# Patient Record
Sex: FEMALE | Race: WHITE | NOT HISPANIC OR LATINO | URBAN - METROPOLITAN AREA
[De-identification: names, ages, dates, MRNs, and addresses within clinical notes are randomized per-mention and may not be internally consistent; named-entity substitution may affect disease eponyms.]

---
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
## 2021-07-20 PROBLEM — Z00.00 ENCOUNTER FOR PREVENTIVE HEALTH EXAMINATION: Status: ACTIVE | Noted: 2021-07-20

## 2021-08-11 ENCOUNTER — APPOINTMENT (OUTPATIENT)
Dept: OBGYN | Facility: CLINIC | Age: 21
End: 2021-08-11

## 2021-11-19 ENCOUNTER — APPOINTMENT (OUTPATIENT)
Dept: OBGYN | Facility: CLINIC | Age: 21
End: 2021-11-19
Payer: MEDICAID

## 2021-11-19 ENCOUNTER — NON-APPOINTMENT (OUTPATIENT)
Age: 21
End: 2021-11-19

## 2021-11-19 VITALS
DIASTOLIC BLOOD PRESSURE: 72 MMHG | BODY MASS INDEX: 26.63 KG/M2 | WEIGHT: 156 LBS | SYSTOLIC BLOOD PRESSURE: 120 MMHG | HEIGHT: 64 IN

## 2021-11-19 DIAGNOSIS — Z32.02 ENCOUNTER FOR PREGNANCY TEST, RESULT NEGATIVE: ICD-10-CM

## 2021-11-19 DIAGNOSIS — Z30.011 ENCOUNTER FOR INITIAL PRESCRIPTION OF CONTRACEPTIVE PILLS: ICD-10-CM

## 2021-11-19 DIAGNOSIS — Z01.419 ENCOUNTER FOR GYNECOLOGICAL EXAMINATION (GENERAL) (ROUTINE) W/OUT ABNORMAL FINDINGS: ICD-10-CM

## 2021-11-19 DIAGNOSIS — Z78.9 OTHER SPECIFIED HEALTH STATUS: ICD-10-CM

## 2021-11-19 DIAGNOSIS — Z11.3 ENCOUNTER FOR SCREENING FOR INFECTIONS WITH A PREDOMINANTLY SEXUAL MODE OF TRANSMISSION: ICD-10-CM

## 2021-11-19 LAB
HCG UR QL: NEGATIVE
QUALITY CONTROL: YES

## 2021-11-19 PROCEDURE — 99385 PREV VISIT NEW AGE 18-39: CPT

## 2021-11-19 PROCEDURE — 36415 COLL VENOUS BLD VENIPUNCTURE: CPT

## 2021-11-19 PROCEDURE — 81025 URINE PREGNANCY TEST: CPT

## 2021-11-19 RX ORDER — DESOGESTREL AND ETHINYL ESTRADIOL 0.15-0.03
0.15-3 KIT ORAL DAILY
Qty: 84 | Refills: 3 | Status: ACTIVE | COMMUNITY
Start: 2021-11-19 | End: 1900-01-01

## 2021-11-21 PROBLEM — Z78.9 EXERCISES OCCASIONALLY: Status: ACTIVE | Noted: 2021-11-19

## 2021-11-21 PROBLEM — Z32.02 ENCOUNTER FOR PREGNANCY TEST WITH RESULT NEGATIVE: Status: ACTIVE | Noted: 2021-11-21

## 2021-11-21 NOTE — PHYSICAL EXAM
[Chaperone Present] : A chaperone was present in the examining room during all aspects of the physical examination [Appropriately responsive] : appropriately responsive [Alert] : alert [No Acute Distress] : no acute distress [No Lymphadenopathy] : no lymphadenopathy [Soft] : soft [Non-tender] : non-tender [Non-distended] : non-distended [Oriented x3] : oriented x3 [Examination Of The Breasts] : a normal appearance [No Discharge] : no discharge [No Masses] : no breast masses were palpable [Labia Majora] : normal [Labia Minora] : normal [Normal] : normal [Uterine Adnexae] : non-palpable

## 2021-11-21 NOTE — HISTORY OF PRESENT ILLNESS
[N] : Patient does not use contraception [Y] : Positive pregnancy history [Currently Active] : currently active [Men] : men [No] : No [Menarche Age: ____] : age at menarche was [unfilled] [Patient would like to be screened for STIs] : Patient would like to be screened for STIs [LMPDate] : 10/24/21 [PapSmeardate] : NEVER [PGHxTotal] : 2 [[facility] Center[facility]Living] : 0 [PGHxABSpont] : 2 [FreeTextEntry1] : 10/24/21

## 2021-11-21 NOTE — DISCUSSION/SUMMARY
[FreeTextEntry1] :   The benefits of adequate calcium intake and a daily multivitamin along with routine daily cardiovascular exercise were reviewed with the patient.\par Patient was counseled on all contraceptive methods (barrier, OCPs both combined and progestin-only, patch, vaginal ring, DMPA injection, LARCs, sterilization) and elects to use combined oral contraception. \par \par Common side-effects of CHC were reviewed. Typical effectiveness rates of 91% were reviewed. The patient was instructed in the correct use of OCPs and what to do in the event of missed doses. The patient was also counseled about the reduced contraceptive effectiveness if doses are missed and the recommendation for condom use for 1 week after. The patient was counseled on the risk of blood clot and stroke, but that the risk of stroke from pregnancy outweighs that from CHC. The patient denies history of blood clot/HTN/CVA/migraine with aura/breast cancer/liver disease/gallbladder disease/FH of first degree relative with VTE/CVA. Reviewed option of continuous CHC and that breakthrough bleeding may occur with a continuous regimen. \par \par She is aware that OCPs do not protect against STDs and encouraged her to use condoms with her contraception if she is at risk for an STD. \par \par She was also told to call with any problematic side-effects to discuss management or changing to another contraceptive method before discontinuing. \par  \par   The importance of safer-sex was discussed with the patient.\par We reviewed ASCCP/ACOG guidelines for pap smears.

## 2021-11-21 NOTE — REVIEW OF SYSTEMS
[Negative] : Heme/Lymph [Patient Intake Form Reviewed] : Patient intake form was reviewed [Nipple Discharge] : nipple discharge [Fever] : no fever [Chills] : no chills [Dyspnea] : no dyspnea [Chest Pain] : no chest pain [Abdominal Pain] : no abdominal pain [Abn Vaginal bleeding] : no abnormal vaginal bleeding [Breast Pain] : no breast pain [Headache] : no headache [Depression] : no depression

## 2021-11-23 LAB
C TRACH RRNA SPEC QL NAA+PROBE: NOT DETECTED
HAV IGM SER QL: NONREACTIVE
HBV CORE IGM SER QL: NONREACTIVE
HBV SURFACE AG SER QL: NONREACTIVE
HCV AB SER QL: NONREACTIVE
HCV S/CO RATIO: 0.11 S/CO
HIV1+2 AB SPEC QL IA.RAPID: NONREACTIVE
N GONORRHOEA RRNA SPEC QL NAA+PROBE: NOT DETECTED
SOURCE TP AMPLIFICATION: NORMAL
T PALLIDUM AB SER QL IA: NEGATIVE

## 2021-12-01 LAB — CYTOLOGY CVX/VAG DOC THIN PREP: NORMAL

## 2022-08-22 NOTE — ED PEDIATRIC NURSE NOTE - FINAL NURSING ELECTRONIC SIGNATURE
Patient's daughter is calling because her mother is not sleeping at night and she needs something to assist with her sleeping. Patient's daughter want to know if her mother can use trazodone to assist with sleeping or something else.     Pharmacy is [address]    [phone] 11-May-2018 18:13

## 2023-08-21 ENCOUNTER — OFFICE (OUTPATIENT)
Dept: URBAN - METROPOLITAN AREA CLINIC 103 | Facility: CLINIC | Age: 23
Setting detail: OPHTHALMOLOGY
End: 2023-08-21
Payer: COMMERCIAL

## 2023-08-21 DIAGNOSIS — H16.223: ICD-10-CM

## 2023-08-21 PROCEDURE — 99213 OFFICE O/P EST LOW 20 MIN: CPT | Performed by: REGISTERED NURSE

## 2023-08-21 ASSESSMENT — TONOMETRY
OS_IOP_MMHG: 18
OD_IOP_MMHG: 18

## 2023-08-21 ASSESSMENT — SPHEQUIV_DERIVED
OS_SPHEQUIV: -1.875
OD_SPHEQUIV: -1.75

## 2023-08-21 ASSESSMENT — AXIALLENGTH_DERIVED
OD_AL: 23.8636
OS_AL: 23.9135

## 2023-08-21 ASSESSMENT — REFRACTION_AUTOREFRACTION
OD_AXIS: 112
OD_SPHERE: -1.50
OS_SPHERE: -1.50
OD_CYLINDER: -0.50
OS_AXIS: 017
OS_CYLINDER: -0.75

## 2023-08-21 ASSESSMENT — VISUAL ACUITY
OD_BCVA: 20/25-1
OS_BCVA: 20/20

## 2023-08-21 ASSESSMENT — SUPERFICIAL PUNCTATE KERATITIS (SPK)
OS_SPK: 1+
OD_SPK: 1+

## 2023-08-21 ASSESSMENT — KERATOMETRY
OD_K2POWER_DIOPTERS: 45.00
OD_K1POWER_DIOPTERS: 44.25
OS_K1POWER_DIOPTERS: 44.25
OS_AXISANGLE_DEGREES: 094
OD_AXISANGLE_DEGREES: 054
OS_K2POWER_DIOPTERS: 45.00

## 2023-08-21 ASSESSMENT — CONFRONTATIONAL VISUAL FIELD TEST (CVF)
OS_FINDINGS: FULL
OD_FINDINGS: FULL